# Patient Record
Sex: FEMALE | Race: WHITE | NOT HISPANIC OR LATINO | Employment: FULL TIME | URBAN - METROPOLITAN AREA
[De-identification: names, ages, dates, MRNs, and addresses within clinical notes are randomized per-mention and may not be internally consistent; named-entity substitution may affect disease eponyms.]

---

## 2021-11-22 ENCOUNTER — APPOINTMENT (OUTPATIENT)
Dept: URGENT CARE | Facility: MEDICAL CENTER | Age: 26
End: 2021-11-22

## 2021-11-22 PROCEDURE — 86787 VARICELLA-ZOSTER ANTIBODY: CPT | Performed by: PHYSICIAN ASSISTANT

## 2021-11-22 PROCEDURE — 86480 TB TEST CELL IMMUN MEASURE: CPT | Performed by: PHYSICIAN ASSISTANT

## 2023-05-17 ENCOUNTER — OFFICE VISIT (OUTPATIENT)
Dept: OBGYN CLINIC | Facility: CLINIC | Age: 28
End: 2023-05-17

## 2023-05-17 VITALS — DIASTOLIC BLOOD PRESSURE: 52 MMHG | SYSTOLIC BLOOD PRESSURE: 106 MMHG | WEIGHT: 145 LBS

## 2023-05-17 DIAGNOSIS — N89.8 VAGINAL DISCHARGE: Primary | ICD-10-CM

## 2023-05-17 DIAGNOSIS — B96.89 BACTERIAL VAGINITIS: ICD-10-CM

## 2023-05-17 DIAGNOSIS — N76.0 BACTERIAL VAGINITIS: ICD-10-CM

## 2023-05-17 LAB
CLUE CELLS SPEC QL WET PREP: POSITIVE
T VAGINALIS VAG QL WET PREP: NEGATIVE
YEAST VAG QL WET PREP: NEGATIVE

## 2023-05-17 RX ORDER — TINIDAZOLE 500 MG/1
2 TABLET ORAL DAILY
Qty: 8 TABLET | Refills: 0 | Status: SHIPPED | OUTPATIENT
Start: 2023-05-17 | End: 2023-05-19

## 2023-05-17 RX ORDER — LEVONORGESTREL 1.5 MG
TABLET ORAL
COMMUNITY
Start: 2023-02-09

## 2023-05-17 RX ORDER — BUTALBITAL, ACETAMINOPHEN AND CAFFEINE 50; 325; 40 MG/1; MG/1; MG/1
TABLET ORAL
COMMUNITY
Start: 2023-05-10

## 2023-05-17 NOTE — PROGRESS NOTES
Assessment/Plan:    1  Vaginal discharge  - +BV on wet mount  rx sent to pharmacy  - [address] [place] [address] amplified DNA by PCR  - POCT wet mount    2  Bacterial vaginitis  - tinidazole (TINDAMAX) 500 MG tablet; Take 4 tablets (2,000 mg total) by mouth daily for 2 days  Dispense: 8 tablet; Refill: 0      Subjective:      Patient ID: Moise Seip is a 33 y o  female  Pt presents today with c/o increased vaginal discharge and irritation  Has hx of frequent BV infections  Symptoms today feel similar to prior episodes of BV  Pt states the discharge has been present for one day  Describes the discharge as white, mildly irritative  Denies overt odor but states the discharge feels off  Pt has not self treated  States has only responded to tinidazole for BV infections in the past  Denies irregular menses, abd pain, fever, chills, dysuria, or change in household detergents or hygiene products  Pt is sexually active  Denies new partner  Was using OCPs for contraception just recently stopped cOCPs due t migraine with aura  Working with her regular GYN to find an alternative option  Requests STD testing  The following portions of the patient's history were reviewed and updated as appropriate: allergies, current medications, past family history, past medical history, past social history, past surgical history and problem list     Review of Systems      Objective:      /52 (BP Location: Left arm, Patient Position: Sitting, Cuff Size: Standard)   Wt 65 8 kg (145 lb)   LMP 05/12/2023 (Exact Date)          Physical Exam  Vitals reviewed  HENT:      Head: Normocephalic and atraumatic  Pulmonary:      Effort: Pulmonary effort is normal    Abdominal:      General: Abdomen is flat  Genitourinary:     General: Normal vulva  Labia:         Right: No rash, tenderness or lesion  Left: No rash, tenderness or lesion  Vagina: Vaginal discharge (minimal thin white discharge) present        Cervix: No cervical motion tenderness, discharge or lesion  Uterus: Not enlarged and not tender  Adnexa:         Right: No mass, tenderness or fullness  Left: No mass, tenderness or fullness  Neurological:      Mental Status: She is alert

## 2023-05-19 ENCOUNTER — OFFICE VISIT (OUTPATIENT)
Dept: OBGYN CLINIC | Facility: CLINIC | Age: 28
End: 2023-05-19

## 2023-05-19 VITALS — SYSTOLIC BLOOD PRESSURE: 116 MMHG | DIASTOLIC BLOOD PRESSURE: 64 MMHG

## 2023-05-19 DIAGNOSIS — Z11.3 SCREEN FOR STD (SEXUALLY TRANSMITTED DISEASE): Primary | ICD-10-CM

## 2023-05-19 NOTE — PROGRESS NOTES
Assessment/Plan:    1  Screen for STD (sexually transmitted disease)  - Chlamydia/GC amplified DNA by PCR    Subjective:      Patient ID: Emily Hernandez is a 33 y o  female  Pt for recollect gc/chlam today  No charge visit as incorrect tube was collected with visit two days ago  The following portions of the patient's history were reviewed and updated as appropriate: allergies, current medications, past family history, past medical history, past social history, past surgical history and problem list     Review of Systems      Objective:      /64   LMP 05/12/2023 (Exact Date)          Physical Exam  Vitals reviewed  HENT:      Head: Normocephalic and atraumatic  Pulmonary:      Effort: Pulmonary effort is normal    Abdominal:      General: Abdomen is flat  Genitourinary:     General: Normal vulva  Labia:         Right: No rash, tenderness or lesion  Left: No rash, tenderness or lesion  Vagina: Vaginal discharge (thin white discharge) present  No bleeding  Cervix: No discharge, lesion or cervical bleeding  Uterus: Not enlarged and not tender  Adnexa:         Right: No mass, tenderness or fullness  Left: No mass, tenderness or fullness  Neurological:      Mental Status: She is alert

## 2023-05-20 LAB
C TRACH RRNA SPEC QL NAA+PROBE: NOT DETECTED
N GONORRHOEA RRNA SPEC QL NAA+PROBE: NOT DETECTED

## 2023-07-14 ENCOUNTER — OFFICE VISIT (OUTPATIENT)
Dept: OBGYN CLINIC | Facility: CLINIC | Age: 28
End: 2023-07-14
Payer: COMMERCIAL

## 2023-07-14 VITALS — WEIGHT: 145 LBS | SYSTOLIC BLOOD PRESSURE: 110 MMHG | DIASTOLIC BLOOD PRESSURE: 74 MMHG

## 2023-07-14 DIAGNOSIS — B96.89 BACTERIAL VAGINITIS: Primary | ICD-10-CM

## 2023-07-14 DIAGNOSIS — N76.0 BACTERIAL VAGINITIS: Primary | ICD-10-CM

## 2023-07-14 DIAGNOSIS — N89.8 VAGINAL IRRITATION: ICD-10-CM

## 2023-07-14 PROCEDURE — 99213 OFFICE O/P EST LOW 20 MIN: CPT | Performed by: PHYSICIAN ASSISTANT

## 2023-07-14 PROCEDURE — 87210 SMEAR WET MOUNT SALINE/INK: CPT | Performed by: PHYSICIAN ASSISTANT

## 2023-07-14 RX ORDER — CLINDAMYCIN PHOSPHATE 20 MG/G
1 CREAM VAGINAL
Qty: 40 G | Refills: 0 | Status: SHIPPED | OUTPATIENT
Start: 2023-07-14 | End: 2023-07-21

## 2023-07-15 NOTE — PROGRESS NOTES
Assessment/Plan:    1. Bacterial vaginitis  -+clue cells on wet mount. Will trial cleocin cream. rx sent. Given recurrent nature of infections will send out genital comprehensive culture  - Genital Comprehensive Culture  - POCT wet mount  - clindamycin (CLEOCIN) 2 % vaginal cream; Insert 1 applicator into the vagina daily at bedtime for 7 days  Dispense: 40 g; Refill: 0    2. Vaginal irritation  - POCT wet mount      Subjective:      Patient ID: Ozzie Amador is a 33 y.o. female. Patient presents today for a problem visit. She reports several week history of increased watery vaginal discharge as well as vaginal burning. She has a history of frequent BV infections and states today's symptoms feel similar to prior BV infections. She does not typically have odor with outbreaks. No odor is present today. She was treated in the office back in May for similar symptoms. She was given Tindamax as this is typically what works best for her BV infections. Denies abdominal pain, fever, chills, dysuria. She has not been sexually active recently. Was recently switched to the progesterone only pill which she has not yet started as she is waiting for her menses. Has a history of irregular menstrual cycles. Advised her to contact her routine GYN regarding this as she may need Provera once 90 days without a menses. The following portions of the patient's history were reviewed and updated as appropriate: allergies, current medications, past family history, past medical history, past social history, past surgical history and problem list.    Review of Systems      Objective:      /74 (BP Location: Left arm, Patient Position: Sitting, Cuff Size: Standard)   Wt 65.8 kg (145 lb)   LMP  (LMP Unknown)          Physical Exam  Vitals reviewed. Constitutional:       Appearance: Normal appearance. HENT:      Head: Normocephalic and atraumatic.    Pulmonary:      Effort: Pulmonary effort is normal. Abdominal:      General: Abdomen is flat. There is no distension. Palpations: There is no mass. Tenderness: There is no abdominal tenderness. Genitourinary:     General: Normal vulva. Labia:         Right: No rash, tenderness or lesion. Left: No rash, tenderness or lesion. Vagina: Vaginal discharge (watery thin, clear/white discharge) present. Cervix: No cervical motion tenderness, discharge or lesion. Uterus: Not enlarged and not tender. Adnexa:         Right: No mass, tenderness or fullness. Left: No mass, tenderness or fullness. Neurological:      Mental Status: She is alert.

## 2023-08-29 ENCOUNTER — OFFICE VISIT (OUTPATIENT)
Dept: OBGYN CLINIC | Facility: CLINIC | Age: 28
End: 2023-08-29
Payer: COMMERCIAL

## 2023-08-29 VITALS — SYSTOLIC BLOOD PRESSURE: 118 MMHG | DIASTOLIC BLOOD PRESSURE: 70 MMHG | WEIGHT: 144.6 LBS

## 2023-08-29 DIAGNOSIS — Z11.3 SCREEN FOR STD (SEXUALLY TRANSMITTED DISEASE): Primary | ICD-10-CM

## 2023-08-29 PROCEDURE — 87661 TRICHOMONAS VAGINALIS AMPLIF: CPT | Performed by: PHYSICIAN ASSISTANT

## 2023-08-29 PROCEDURE — 87591 N.GONORRHOEAE DNA AMP PROB: CPT | Performed by: PHYSICIAN ASSISTANT

## 2023-08-29 PROCEDURE — 99213 OFFICE O/P EST LOW 20 MIN: CPT | Performed by: PHYSICIAN ASSISTANT

## 2023-08-29 PROCEDURE — 87491 CHLMYD TRACH DNA AMP PROBE: CPT | Performed by: PHYSICIAN ASSISTANT

## 2023-08-29 RX ORDER — NYSTATIN 100000 U/G
1 CREAM TOPICAL 2 TIMES DAILY
COMMUNITY
Start: 2023-07-14

## 2023-08-29 NOTE — PROGRESS NOTES
Sergey Booth  1995    S:  Patient presents today for STD testing. She denies vaginal discharge, itching or odor. Change in partner: yes. Recent change in partner. Desires testing due to change in partner. She is not presently using anything for birth control. Current partner is sterile. Hx of irregular menstrual cycles/PCOS. Has not had menses since April? . Was given a course of Provera to take by her regular OBGYN after completing negative HPT. She has not yet started the Provera. Encouraged to do so. Advised course of Provera to allow for withdrawal bleed to protect endometrium. Encouraged her to start Slynd after withdrawal bleed from Provera for endometrial protection and added contraceptive benefits    Social History     Socioeconomic History   • Marital status: Single     Spouse name: None   • Number of children: None   • Years of education: None   • Highest education level: None   Occupational History   • None   Tobacco Use   • Smoking status: Former     Types: Cigarettes     Quit date:      Years since quittin.6   • Smokeless tobacco: Never   Vaping Use   • Vaping Use: Never used   Substance and Sexual Activity   • Alcohol use:  Yes     Alcohol/week: 4.0 standard drinks of alcohol     Types: 1 Glasses of wine, 1 Cans of beer, 1 Shots of liquor, 1 Standard drinks or equivalent per week     Comment: socially   • Drug use: Never   • Sexual activity: Yes     Partners: Male     Birth control/protection: None   Other Topics Concern   • None   Social History Narrative   • None     Social Determinants of Health     Financial Resource Strain: Not on file   Food Insecurity: Not on file   Transportation Needs: Not on file   Physical Activity: Not on file   Stress: Not on file   Social Connections: Not on file   Intimate Partner Violence: Not on file   Housing Stability: Not on file     Family History   Problem Relation Age of Onset   • No Known Problems Mother    • No Known Problems Father        Review of Systems   Respiratory: Negative. Cardiovascular: Negative. Gastrointestinal: Negative for constipation and diarrhea. Genitourinary: Negative for difficulty urinating, pelvic pain, vaginal bleeding, vaginal discharge, itching or odor. O:  /70 (BP Location: Left arm, Patient Position: Standing, Cuff Size: Standard)   Wt 65.6 kg (144 lb 9.6 oz)     External genitals are normal without lesions or rashes. Vagina is normal without discharge or bleeding. No lesions  Cervix is normal without lesion or discharge. No CMT   Uterus is normal size and shape, nontender and mobile    A/P:  STD screening. Will await Gc/chlamydia testing. Pt does want lab testing including HIV, hepatitis, and syphilis testing and a script was provided for this. She will be contacted with the results of her testing. Consistent condom use was recommended. Start Provera to initiate withdrawal bleed then start Slynd.

## 2023-08-31 LAB
C TRACH DNA SPEC QL NAA+PROBE: NEGATIVE
N GONORRHOEA DNA SPEC QL NAA+PROBE: NEGATIVE
T VAGINALIS DNA SPEC QL NAA+PROBE: NEGATIVE

## 2023-09-05 LAB
EXTERNAL HIV SCREEN: NORMAL
HCV AB SER-ACNC: NORMAL

## 2023-10-05 ENCOUNTER — OFFICE VISIT (OUTPATIENT)
Dept: OBGYN CLINIC | Facility: MEDICAL CENTER | Age: 28
End: 2023-10-05
Payer: COMMERCIAL

## 2023-10-05 VITALS — SYSTOLIC BLOOD PRESSURE: 118 MMHG | DIASTOLIC BLOOD PRESSURE: 78 MMHG

## 2023-10-05 DIAGNOSIS — B96.89 BACTERIAL VAGINITIS: Primary | ICD-10-CM

## 2023-10-05 DIAGNOSIS — N76.0 BACTERIAL VAGINITIS: Primary | ICD-10-CM

## 2023-10-05 LAB
BV WHIFF TEST VAG QL: NEGATIVE
CLUE CELLS SPEC QL WET PREP: POSITIVE
T VAGINALIS VAG QL WET PREP: NEGATIVE
YEAST VAG QL WET PREP: NEGATIVE

## 2023-10-05 PROCEDURE — 99213 OFFICE O/P EST LOW 20 MIN: CPT | Performed by: PHYSICIAN ASSISTANT

## 2023-10-05 PROCEDURE — 87210 SMEAR WET MOUNT SALINE/INK: CPT | Performed by: PHYSICIAN ASSISTANT

## 2023-10-05 RX ORDER — TINIDAZOLE 500 MG/1
2 TABLET ORAL
Qty: 8 TABLET | Refills: 0 | Status: SHIPPED | OUTPATIENT
Start: 2023-10-05 | End: 2023-10-07

## 2023-10-05 NOTE — PROGRESS NOTES
Assessment/Plan:    1. Bacterial vaginitis  - wet mount + for BV. Rx sent for tindamax. Advised avoiding Always brand. Trial organic line tampons/pads  - tinidazole (TINDAMAX) 500 MG tablet; Take 4 tablets (2,000 mg total) by mouth daily with breakfast for 2 days  Dispense: 8 tablet; Refill: 0  - POCT wet mount      Subjective:      Patient ID: Brandan Jackson is a 33 y.o. female. Pt presents today with c/o increased vaginal discharge and irritation. Pt states the discharge has been present for about 3 days. Describes the discharge as watery, clear-white with clumps. Describes irritation as a burning sensation. Noted symptoms started soon after her last menses ended. Does use always brand pads and tampons. She is prone to frequent BV infections. Denies odor, abd pain, dysuria, or change in household detergents or hygiene products. Pt is sexually active. Denies new partner. Partner is sterile so she is not using  for contraception. Declines STD testing. Had testing recently and negative      The following portions of the patient's history were reviewed and updated as appropriate: allergies, current medications, past family history, past medical history, past social history, past surgical history and problem list.    Review of Systems      Objective:      /78 (BP Location: Left arm, Patient Position: Standing, Cuff Size: Adult)   LMP 09/23/2023 (Exact Date)          Physical Exam  Vitals reviewed. Constitutional:       Appearance: Normal appearance. HENT:      Head: Normocephalic and atraumatic. Pulmonary:      Effort: Pulmonary effort is normal.   Abdominal:      General: Abdomen is flat. There is no distension. Palpations: There is no mass. Tenderness: There is no abdominal tenderness. Genitourinary:     General: Normal vulva. Labia:         Right: No rash, tenderness or lesion. Left: No rash, tenderness or lesion.        Vagina: Vaginal discharge (white mucus discharge) present. Cervix: No cervical motion tenderness, discharge or lesion. Uterus: Not enlarged and not tender. Adnexa:         Right: No mass, tenderness or fullness. Left: No mass, tenderness or fullness. Neurological:      Mental Status: She is alert.

## 2023-11-16 ENCOUNTER — APPOINTMENT (OUTPATIENT)
Dept: LAB | Facility: HOSPITAL | Age: 28
End: 2023-11-16
Payer: COMMERCIAL

## 2023-11-16 DIAGNOSIS — Z11.3 SCREEN FOR STD (SEXUALLY TRANSMITTED DISEASE): Primary | ICD-10-CM

## 2023-11-17 ENCOUNTER — OFFICE VISIT (OUTPATIENT)
Dept: OBGYN CLINIC | Facility: CLINIC | Age: 28
End: 2023-11-17
Payer: COMMERCIAL

## 2023-11-17 VITALS — SYSTOLIC BLOOD PRESSURE: 112 MMHG | DIASTOLIC BLOOD PRESSURE: 68 MMHG | WEIGHT: 147 LBS

## 2023-11-17 DIAGNOSIS — N89.8 VAGINAL IRRITATION: Primary | ICD-10-CM

## 2023-11-17 DIAGNOSIS — Z32.01 POSITIVE URINE PREGNANCY TEST: ICD-10-CM

## 2023-11-17 PROBLEM — E03.9 HYPOTHYROIDISM: Chronic | Status: ACTIVE | Noted: 2018-05-22

## 2023-11-17 PROBLEM — O35.8XX0 FAMILY OR MATERNAL HISTORIC RISK OF CONGENITAL ANOMALY, ANTEPARTUM: Status: ACTIVE | Noted: 2021-03-11

## 2023-11-17 PROBLEM — Q71.891: Chronic | Status: ACTIVE | Noted: 2023-11-17

## 2023-11-17 LAB
C TRACH DNA SPEC QL NAA+PROBE: NEGATIVE
N GONORRHOEA DNA SPEC QL NAA+PROBE: NEGATIVE

## 2023-11-17 PROCEDURE — 87077 CULTURE AEROBIC IDENTIFY: CPT | Performed by: PHYSICIAN ASSISTANT

## 2023-11-17 PROCEDURE — 99213 OFFICE O/P EST LOW 20 MIN: CPT | Performed by: PHYSICIAN ASSISTANT

## 2023-11-17 PROCEDURE — 87070 CULTURE OTHR SPECIMN AEROBIC: CPT | Performed by: PHYSICIAN ASSISTANT

## 2023-11-17 RX ORDER — ONDANSETRON 4 MG/1
4 TABLET, ORALLY DISINTEGRATING ORAL EVERY 8 HOURS PRN
COMMUNITY
Start: 2023-11-17 | End: 2023-11-27

## 2023-11-17 RX ORDER — TINIDAZOLE 500 MG/1
1000 TABLET ORAL
COMMUNITY
Start: 2023-11-14 | End: 2023-11-19

## 2023-11-17 RX ORDER — NYSTATIN 100000 U/G
CREAM TOPICAL
COMMUNITY
Start: 2023-11-15

## 2023-11-17 RX ORDER — METRONIDAZOLE 500 MG/1
TABLET ORAL
COMMUNITY
Start: 2023-11-02

## 2023-11-17 NOTE — PROGRESS NOTES
Assessment/Plan:    1. Vaginal irritation  - await cultures. Advised trial of hydrocortisone cream externally BID for the next week. Advised no intercourse, shaving for at least the next week. Switch to [facility] bar soap, continue all free and clear detergent  - Genital Comprehensive Culture    2. Positive urine pregnancy test  -+ home test last week. Hcg quant negative in ED today. Likely chemical pregnancy      Subjective:      Patient ID: Wilmar Hoff is a 33 y.o. female. Pt presents today for a problem visit. Reports several week history of vulvovaginal irritation. Has been treated for BV twice in the past few weeks. Symptoms of irritation and discomfort persist. Desires testing today. She was evaluated in the ED this AM for cramping and bleeding in the setting of a + home pregnancy test. Took positive home test last week after missed menses and an instance of spotting. Woke up this morning to heavy VB. Hcg in ED <1. TVUS unremarkable. Suspect early chemical pregnancy which was explained to pt today. She is still bleeding this afternoon. Reviewed results of wet mount and genital comprehensive panel may be skewed by vaginal blood today. Would be more accurate to perform testing after bleeding resolves. She would like to complete genital comprehensive panel today. Denies vaginal discharge, odor, fever, chills, dysuria, change in soaps, change in partner. The following portions of the patient's history were reviewed and updated as appropriate: allergies, current medications, past family history, past medical history, past social history, past surgical history, and problem list.    Review of Systems      Objective:      /68 (BP Location: Left arm, Patient Position: Sitting, Cuff Size: Standard)   Wt 66.7 kg (147 lb)          Physical Exam  Vitals reviewed. Constitutional:       General: She is not in acute distress. Appearance: Normal appearance.  She is not ill-appearing or toxic-appearing. HENT:      Head: Normocephalic and atraumatic. Pulmonary:      Effort: Pulmonary effort is normal.   Abdominal:      General: Abdomen is flat. Bowel sounds are normal. There is no distension. Palpations: There is no mass. Tenderness: There is no abdominal tenderness. There is no guarding. Genitourinary:     General: Normal vulva. Labia:         Right: No rash, tenderness or lesion. Left: No rash, tenderness or lesion. Vagina: Bleeding (moderate vaginal blood in vault) present. No lesions. Cervix: Cervical bleeding present. No cervical motion tenderness or lesion. Uterus: Not enlarged and not tender. Adnexa:         Right: No mass, tenderness or fullness. Left: No mass, tenderness or fullness. Comments: Blood cleared from vault with proctoswabs prior to collecting genital comprehensive panel  Neurological:      Mental Status: She is alert. Psychiatric:         Mood and Affect: Mood normal.         Behavior: Behavior normal.         Thought Content:  Thought content normal.         Judgment: Judgment normal.

## 2023-11-19 LAB — BACTERIA GENITAL AEROBE CULT: ABNORMAL

## 2023-11-21 LAB
BACTERIA GENITAL AEROBE CULT: ABNORMAL
BACTERIA GENITAL AEROBE CULT: ABNORMAL

## 2024-01-19 ENCOUNTER — OFFICE VISIT (OUTPATIENT)
Dept: OBGYN CLINIC | Facility: CLINIC | Age: 29
End: 2024-01-19
Payer: COMMERCIAL

## 2024-01-19 VITALS — SYSTOLIC BLOOD PRESSURE: 118 MMHG | DIASTOLIC BLOOD PRESSURE: 68 MMHG | WEIGHT: 145 LBS

## 2024-01-19 DIAGNOSIS — B96.89 BACTERIAL VAGINITIS: Primary | ICD-10-CM

## 2024-01-19 DIAGNOSIS — N76.0 BACTERIAL VAGINITIS: Primary | ICD-10-CM

## 2024-01-19 DIAGNOSIS — Z11.3 SCREEN FOR STD (SEXUALLY TRANSMITTED DISEASE): ICD-10-CM

## 2024-01-19 PROCEDURE — 87210 SMEAR WET MOUNT SALINE/INK: CPT | Performed by: PHYSICIAN ASSISTANT

## 2024-01-19 PROCEDURE — 87591 N.GONORRHOEAE DNA AMP PROB: CPT | Performed by: PHYSICIAN ASSISTANT

## 2024-01-19 PROCEDURE — 87491 CHLMYD TRACH DNA AMP PROBE: CPT | Performed by: PHYSICIAN ASSISTANT

## 2024-01-19 PROCEDURE — 99214 OFFICE O/P EST MOD 30 MIN: CPT | Performed by: PHYSICIAN ASSISTANT

## 2024-01-19 RX ORDER — TINIDAZOLE 500 MG/1
1 TABLET ORAL DAILY
Qty: 10 TABLET | Refills: 0 | Status: SHIPPED | OUTPATIENT
Start: 2024-01-19 | End: 2024-01-24

## 2024-01-19 NOTE — PROGRESS NOTES
Assessment/Plan:    1. Bacterial vaginitis  - wet mount with few clue cells. Given symptoms and hx of recurrent BV will treat. Reviewed perineal hygiene in detail. Advised pt to have partner start using Dove sensitive skin soap. Continue probiotic. Discussed trial of OTC boric acid suppositories for recurrent vaginitis. Also discussed the option of once weekly metrogel x 12 weeks for treatment of recurrent BV.   -Rx sent for Tindamax. Advised no shaving x 2 wks. Advised no intercourse for at least one week  - tinidazole (TINDAMAX) 500 MG tablet; Take 2 tablets (1,000 mg total) by mouth in the morning for 5 days  Dispense: 10 tablet; Refill: 0  - POCT wet mount    2. Screen for STD (sexually transmitted disease)  - Chlamydia/GC amplified DNA by PCR      Subjective:      Patient ID: Carmencita Donohue is a 28 y.o. female.    Pt presents to the office today for a problem visit. Hx of frequent BV infections. Reports symptoms similar to prior BV infections are present today. Symptoms started about 2 weeks ago. Reports increased watery grey/yellow discharge, slight odor, vulvovaginal irritation. She uses Dove sensitive skin soap. Partner uses Dove mens. Takes daily vaginal probiotic. Typically responds to Tindamax only for BV infections.    Additionally reports a small lump noted to the perineum yesterday. She does report shaving often. She states the lump is mildly tender. No drainage. Denies fever, chills, abdominal pain, dysuria, frequency, urgency. She would like STI screening today    LMP 1/7/2024. Had early SAB in December. Does not desire birth control at this time. Not actively trying for pregnancy but would accept pregnancy should she conceive.        The following portions of the patient's history were reviewed and updated as appropriate: allergies, current medications, past family history, past medical history, past social history, past surgical history, and problem list.    Review of Systems       Objective:      /68 (BP Location: Right arm, Patient Position: Standing, Cuff Size: Standard)   Wt 65.8 kg (145 lb)   LMP 01/07/2024 (Exact Date)          Physical Exam  Vitals reviewed.   Constitutional:       Appearance: Normal appearance.   HENT:      Head: Normocephalic and atraumatic.   Pulmonary:      Effort: Pulmonary effort is normal.   Abdominal:      General: Abdomen is flat. There is no distension.      Palpations: There is no mass.      Tenderness: There is no abdominal tenderness. There is no guarding.   Genitourinary:     Exam position: Lithotomy position.      Vagina: Vaginal discharge (thin grey/white discharge noted) present. No bleeding or prolapsed vaginal walls.      Cervix: No cervical motion tenderness, lesion or cervical bleeding.      Uterus: Not enlarged and not tender.       Adnexa:         Right: No mass, tenderness or fullness.          Left: No mass, tenderness or fullness.            Comments: Labia minora noted to be mildly erythematous  Small sub-centimeter ingrown hair vs epidermal cyst noted on right perineum  Lymphadenopathy:      Lower Body: No right inguinal adenopathy. No left inguinal adenopathy.   Neurological:      Mental Status: She is alert.   Psychiatric:         Mood and Affect: Mood normal.         Behavior: Behavior normal.         Thought Content: Thought content normal.         Judgment: Judgment normal.

## 2024-01-23 LAB
C TRACH DNA SPEC QL NAA+PROBE: NEGATIVE
N GONORRHOEA DNA SPEC QL NAA+PROBE: NEGATIVE